# Patient Record
Sex: FEMALE | Race: ASIAN | NOT HISPANIC OR LATINO
[De-identification: names, ages, dates, MRNs, and addresses within clinical notes are randomized per-mention and may not be internally consistent; named-entity substitution may affect disease eponyms.]

---

## 2019-05-08 PROBLEM — Z00.00 ENCOUNTER FOR PREVENTIVE HEALTH EXAMINATION: Status: ACTIVE | Noted: 2019-05-08

## 2019-06-08 ENCOUNTER — APPOINTMENT (OUTPATIENT)
Dept: OPHTHALMOLOGY | Facility: CLINIC | Age: 44
End: 2019-06-08

## 2019-10-08 ENCOUNTER — APPOINTMENT (OUTPATIENT)
Dept: OPHTHALMOLOGY | Facility: CLINIC | Age: 44
End: 2019-10-08

## 2019-10-29 ENCOUNTER — APPOINTMENT (OUTPATIENT)
Dept: OPHTHALMOLOGY | Facility: CLINIC | Age: 44
End: 2019-10-29

## 2019-11-07 ENCOUNTER — APPOINTMENT (OUTPATIENT)
Dept: PAIN MANAGEMENT | Facility: CLINIC | Age: 44
End: 2019-11-07

## 2019-11-07 VITALS
SYSTOLIC BLOOD PRESSURE: 127 MMHG | DIASTOLIC BLOOD PRESSURE: 72 MMHG | HEIGHT: 57 IN | BODY MASS INDEX: 29.34 KG/M2 | HEART RATE: 62 BPM | WEIGHT: 136 LBS

## 2019-12-15 NOTE — ASSESSMENT
[FreeTextEntry1] : This is a 45 y/o female who claims wearing an ear piece for work at the [employer] increases headaches interfering with her ability to function. She claims having had surgical resection of a right hemispheric meningioma and also suffered a concussion after a slot machine fell on her head one year ago.\par \par In my medical opinion, at the time of my neurological examination, I do not see any focal neurological deficit to impair her ability to use a headset communication device. In view of her history of a right cerebral meningioma, which was reportedly resected successfully, it is my my neurological recommendation that Ms. Salma Woo, may benefit from using a hand held device instead of a headset communication device. \par \par \par \par _____________________________________________________________________________\par \par Barrie Price MD

## 2019-12-15 NOTE — HISTORY OF PRESENT ILLNESS
[FreeTextEntry1] : This is a case of a 45 y/o right handed female diagnosed with a right hemispheric meningioma in 2015 after complaining of headaches.  She reports being out of work for about one year following the surgical resection. Despite the surgery, she continues to complain of headaches especially when wearing glasses or using her cell phone. She reports having tried wearing the ear piece but developed increasing headaches. \par \par She denies taking any prescription medication for the headaches. \par She also reports having a slot machine fall on her head one year ago (exact date unknown) requiring her to remain out of work for a 3 month period. \par \par Review of Records: \par \par 674 pages from [place] [facility] [employer] [facility]

## 2019-12-15 NOTE — PHYSICAL EXAM
[General Appearance - Alert] : alert [General Appearance - Well Developed] : well developed [Affect] : the affect was normal [Person] : oriented to person [Place] : oriented to place [Time] : oriented to time [Concentration Intact] : normal concentrating ability [Visual Intact] : visual attention was ~T not ~L decreased [Naming Objects] : no difficulty naming common objects [Comprehension] : comprehension intact [Fluency] : fluency intact [Cranial Nerves Oculomotor (III)] : extraocular motion intact [Past History] : adequate knowledge of personal past history [Cranial Nerves Optic (II)] : visual acuity intact bilaterally,  visual fields full to confrontation, pupils equal round and reactive to light [Cranial Nerves Vestibulocochlear (VIII)] : hearing was intact bilaterally [Cranial Nerves Trigeminal (V)] : facial sensation intact symmetrically [Cranial Nerves Facial (VII)] : face symmetrical [Cranial Nerves Glossopharyngeal (IX)] : tongue and palate midline [Motor Tone] : muscle tone was normal in all four extremities [Cranial Nerves Hypoglossal (XII)] : there was no tongue deviation with protrusion [Cranial Nerves Accessory (XI - Cranial And Spinal)] : head turning and shoulder shrug symmetric [Motor Strength] : muscle strength was normal in all four extremities [Involuntary Movements] : no involuntary movements were seen [Sensation Tactile Decrease] : light touch was intact [No Muscle Atrophy] : normal bulk in all four extremities [Balance] : balance was intact [Abnormal Walk] : normal gait [2+] : Ankle jerk right 2+ [Outer Ear] : the ears and nose were normal in appearance [Neck Appearance] : the appearance of the neck was normal [Sclera] : the sclera and conjunctiva were normal [Nail Clubbing] : no clubbing  or cyanosis of the fingernails [] : no respiratory distress [Tremor] : no tremor present [Past-pointing] : there was no past-pointing [Plantar Reflex Right Only] : normal on the right [Plantar Reflex Left Only] : normal on the left [___] : absent on the right [___] : absent on the left [FreeTextEntry5] : No papilledema; Intact [place]

## 2020-08-04 ENCOUNTER — APPOINTMENT (OUTPATIENT)
Dept: OPHTHALMOLOGY | Facility: CLINIC | Age: 45
End: 2020-08-04

## 2021-04-09 ENCOUNTER — EMERGENCY (EMERGENCY)
Facility: HOSPITAL | Age: 46
LOS: 1 days | Discharge: HOME | End: 2021-04-09
Admitting: EMERGENCY MEDICINE
Payer: COMMERCIAL

## 2021-04-09 VITALS
DIASTOLIC BLOOD PRESSURE: 92 MMHG | SYSTOLIC BLOOD PRESSURE: 134 MMHG | HEART RATE: 65 BPM | OXYGEN SATURATION: 100 % | TEMPERATURE: 97 F | RESPIRATION RATE: 16 BRPM

## 2021-04-09 LAB
ALBUMIN SERPL ELPH-MCNC: 4.2 G/DL — SIGNIFICANT CHANGE UP (ref 3.3–5)
ALP SERPL-CCNC: 65 U/L — SIGNIFICANT CHANGE UP (ref 40–120)
ALT FLD-CCNC: 13 U/L — SIGNIFICANT CHANGE UP (ref 4–33)
ANION GAP SERPL CALC-SCNC: 9 MMOL/L — SIGNIFICANT CHANGE UP (ref 7–14)
APPEARANCE UR: CLEAR — SIGNIFICANT CHANGE UP
AST SERPL-CCNC: 15 U/L — SIGNIFICANT CHANGE UP (ref 4–32)
BASOPHILS # BLD AUTO: 0.04 K/UL — SIGNIFICANT CHANGE UP (ref 0–0.2)
BASOPHILS NFR BLD AUTO: 0.4 % — SIGNIFICANT CHANGE UP (ref 0–2)
BILIRUB SERPL-MCNC: <0.2 MG/DL — SIGNIFICANT CHANGE UP (ref 0.2–1.2)
BILIRUB UR-MCNC: NEGATIVE — SIGNIFICANT CHANGE UP
BUN SERPL-MCNC: 12 MG/DL — SIGNIFICANT CHANGE UP (ref 7–23)
CALCIUM SERPL-MCNC: 9.2 MG/DL — SIGNIFICANT CHANGE UP (ref 8.4–10.5)
CHLORIDE SERPL-SCNC: 107 MMOL/L — SIGNIFICANT CHANGE UP (ref 98–107)
CO2 SERPL-SCNC: 23 MMOL/L — SIGNIFICANT CHANGE UP (ref 22–31)
COLOR SPEC: SIGNIFICANT CHANGE UP
CREAT SERPL-MCNC: 0.76 MG/DL — SIGNIFICANT CHANGE UP (ref 0.5–1.3)
DIFF PNL FLD: NEGATIVE — SIGNIFICANT CHANGE UP
EOSINOPHIL # BLD AUTO: 0.15 K/UL — SIGNIFICANT CHANGE UP (ref 0–0.5)
EOSINOPHIL NFR BLD AUTO: 1.5 % — SIGNIFICANT CHANGE UP (ref 0–6)
GLUCOSE SERPL-MCNC: 86 MG/DL — SIGNIFICANT CHANGE UP (ref 70–99)
GLUCOSE UR QL: NEGATIVE — SIGNIFICANT CHANGE UP
HCT VFR BLD CALC: 36.7 % — SIGNIFICANT CHANGE UP (ref 34.5–45)
HGB BLD-MCNC: 12.2 G/DL — SIGNIFICANT CHANGE UP (ref 11.5–15.5)
IANC: 7.47 K/UL — SIGNIFICANT CHANGE UP (ref 1.5–8.5)
IMM GRANULOCYTES NFR BLD AUTO: 0.4 % — SIGNIFICANT CHANGE UP (ref 0–1.5)
KETONES UR-MCNC: NEGATIVE — SIGNIFICANT CHANGE UP
LEUKOCYTE ESTERASE UR-ACNC: NEGATIVE — SIGNIFICANT CHANGE UP
LYMPHOCYTES # BLD AUTO: 2.09 K/UL — SIGNIFICANT CHANGE UP (ref 1–3.3)
LYMPHOCYTES # BLD AUTO: 20.3 % — SIGNIFICANT CHANGE UP (ref 13–44)
MCHC RBC-ENTMCNC: 28.2 PG — SIGNIFICANT CHANGE UP (ref 27–34)
MCHC RBC-ENTMCNC: 33.2 GM/DL — SIGNIFICANT CHANGE UP (ref 32–36)
MCV RBC AUTO: 84.8 FL — SIGNIFICANT CHANGE UP (ref 80–100)
MONOCYTES # BLD AUTO: 0.51 K/UL — SIGNIFICANT CHANGE UP (ref 0–0.9)
MONOCYTES NFR BLD AUTO: 5 % — SIGNIFICANT CHANGE UP (ref 2–14)
NEUTROPHILS # BLD AUTO: 7.47 K/UL — HIGH (ref 1.8–7.4)
NEUTROPHILS NFR BLD AUTO: 72.4 % — SIGNIFICANT CHANGE UP (ref 43–77)
NITRITE UR-MCNC: NEGATIVE — SIGNIFICANT CHANGE UP
NRBC # BLD: 0 /100 WBCS — SIGNIFICANT CHANGE UP
NRBC # FLD: 0 K/UL — SIGNIFICANT CHANGE UP
PH UR: 6 — SIGNIFICANT CHANGE UP (ref 5–8)
PLATELET # BLD AUTO: 245 K/UL — SIGNIFICANT CHANGE UP (ref 150–400)
POTASSIUM SERPL-MCNC: 4.2 MMOL/L — SIGNIFICANT CHANGE UP (ref 3.5–5.3)
POTASSIUM SERPL-SCNC: 4.2 MMOL/L — SIGNIFICANT CHANGE UP (ref 3.5–5.3)
PROT SERPL-MCNC: 7.1 G/DL — SIGNIFICANT CHANGE UP (ref 6–8.3)
PROT UR-MCNC: NEGATIVE — SIGNIFICANT CHANGE UP
RBC # BLD: 4.33 M/UL — SIGNIFICANT CHANGE UP (ref 3.8–5.2)
RBC # FLD: 13.5 % — SIGNIFICANT CHANGE UP (ref 10.3–14.5)
SODIUM SERPL-SCNC: 139 MMOL/L — SIGNIFICANT CHANGE UP (ref 135–145)
SP GR SPEC: 1.01 — SIGNIFICANT CHANGE UP (ref 1.01–1.02)
UROBILINOGEN FLD QL: SIGNIFICANT CHANGE UP
WBC # BLD: 10.3 K/UL — SIGNIFICANT CHANGE UP (ref 3.8–10.5)
WBC # FLD AUTO: 10.3 K/UL — SIGNIFICANT CHANGE UP (ref 3.8–10.5)

## 2021-04-09 PROCEDURE — 74177 CT ABD & PELVIS W/CONTRAST: CPT | Mod: 26

## 2021-04-09 PROCEDURE — 76830 TRANSVAGINAL US NON-OB: CPT | Mod: 26

## 2021-04-09 PROCEDURE — 99285 EMERGENCY DEPT VISIT HI MDM: CPT

## 2021-04-09 RX ORDER — IBUPROFEN 200 MG
600 TABLET ORAL ONCE
Refills: 0 | Status: COMPLETED | OUTPATIENT
Start: 2021-04-09 | End: 2021-04-09

## 2021-04-09 NOTE — ED PROVIDER NOTE - CLINICAL SUMMARY MEDICAL DECISION MAKING FREE TEXT BOX
45 year old female with PMH of Brain mass (s/p resection) presents to the ED complaining of left sided pelvic pain for a month. HD stable. On exam abdomen soft, non-distended, mild tenderness to LLQ and Left adnexal area. Imp: Left-sided pelvic pain, concern diverticulitis vs for left adnexal cyst. Plan for labs, TVUS, CT A/P, and UA, analgesics, reassess.

## 2021-04-09 NOTE — ED PROVIDER NOTE - NSFOLLOWUPINSTRUCTIONS_ED_ALL_ED_FT
Follow up with your primary medical doctor within 2-3 days of ER discharge.    If you need to find a doctor to follow up with, you may call the [facility] Patient Access Services helpline at [phone] to find names/contact #s for a practitioner (or specialist) to follow up with.    Bring your discharge papers / test results with you to your follow up appointment(s).    Rest / no strenuous activity.  Stay well hydrated.    ***Return to the Emergency Department immediately if you experience any new / worsening symptoms or have any problems / concerns.***

## 2021-04-09 NOTE — ED PROVIDER NOTE - OBJECTIVE STATEMENT
45 year old female with PMH of Brain mass (s/p resection) presents to the ED complaining of left sided pelvic pain for a month. Pt describes LLQ abdominal pain radiating to the left pelvic area, intermittent, feels like pressure, sharp, relieved by Ibuprofen/Tylenol. Pt denies any dysuria, urinary frequency/urgency, hematuria. Also denies nausea, vomiting, diarrhea, constipation, melena, hematochezia, fevers and chills, weakness, numbness and tingling sensation. Denies any other complaints.

## 2021-04-09 NOTE — ED PROVIDER NOTE - PROGRESS NOTE DETAILS
NENA Dickinson Addendum----This patient was signed out to me by NENA Dunn; case and tests resulted thus far were discussed; pending was pending CT result at time of sign-out.  In the interim, pt noted to be objectively resting comfortably; no issues thus far.  CT abd/pelvis shows no evidence of acute pathology.  Pt. will be discharged per below instructions.

## 2021-04-09 NOTE — ED PROVIDER NOTE - PATIENT PORTAL LINK FT
You can access the FollowMyHealth Patient Portal offered by [facility] by registering at the following website: [email or web address]. By joining [employer]’s FollowMyHealth portal, you will also be able to view your health information using other applications (apps) compatible with our system.

## 2021-04-10 VITALS
OXYGEN SATURATION: 100 % | RESPIRATION RATE: 18 BRPM | TEMPERATURE: 99 F | DIASTOLIC BLOOD PRESSURE: 90 MMHG | HEART RATE: 70 BPM | SYSTOLIC BLOOD PRESSURE: 138 MMHG

## 2021-04-11 LAB
CULTURE RESULTS: NO GROWTH — SIGNIFICANT CHANGE UP
SPECIMEN SOURCE: SIGNIFICANT CHANGE UP

## 2021-11-09 ENCOUNTER — EMERGENCY (EMERGENCY)
Facility: HOSPITAL | Age: 46
LOS: 1 days | Discharge: LEFT BEFORE DISCHARGE | End: 2021-11-09
Attending: EMERGENCY MEDICINE | Admitting: EMERGENCY MEDICINE
Payer: COMMERCIAL

## 2021-11-09 VITALS
SYSTOLIC BLOOD PRESSURE: 142 MMHG | TEMPERATURE: 98 F | OXYGEN SATURATION: 100 % | RESPIRATION RATE: 16 BRPM | DIASTOLIC BLOOD PRESSURE: 73 MMHG | HEART RATE: 60 BPM

## 2021-11-09 VITALS
TEMPERATURE: 100 F | RESPIRATION RATE: 20 BRPM | DIASTOLIC BLOOD PRESSURE: 79 MMHG | HEART RATE: 64 BPM | SYSTOLIC BLOOD PRESSURE: 142 MMHG | OXYGEN SATURATION: 100 %

## 2021-11-09 LAB
ALBUMIN SERPL ELPH-MCNC: 4.2 G/DL — SIGNIFICANT CHANGE UP (ref 3.3–5)
ALP SERPL-CCNC: 73 U/L — SIGNIFICANT CHANGE UP (ref 40–120)
ALT FLD-CCNC: 18 U/L — SIGNIFICANT CHANGE UP (ref 4–33)
ANION GAP SERPL CALC-SCNC: 11 MMOL/L — SIGNIFICANT CHANGE UP (ref 7–14)
APPEARANCE UR: CLEAR — SIGNIFICANT CHANGE UP
AST SERPL-CCNC: 20 U/L — SIGNIFICANT CHANGE UP (ref 4–32)
BASOPHILS # BLD AUTO: 0.04 K/UL — SIGNIFICANT CHANGE UP (ref 0–0.2)
BASOPHILS NFR BLD AUTO: 0.4 % — SIGNIFICANT CHANGE UP (ref 0–2)
BILIRUB SERPL-MCNC: <0.2 MG/DL — SIGNIFICANT CHANGE UP (ref 0.2–1.2)
BILIRUB UR-MCNC: NEGATIVE — SIGNIFICANT CHANGE UP
BUN SERPL-MCNC: 9 MG/DL — SIGNIFICANT CHANGE UP (ref 7–23)
CALCIUM SERPL-MCNC: 9.3 MG/DL — SIGNIFICANT CHANGE UP (ref 8.4–10.5)
CHLORIDE SERPL-SCNC: 105 MMOL/L — SIGNIFICANT CHANGE UP (ref 98–107)
CO2 SERPL-SCNC: 26 MMOL/L — SIGNIFICANT CHANGE UP (ref 22–31)
COLOR SPEC: SIGNIFICANT CHANGE UP
CREAT SERPL-MCNC: 0.67 MG/DL — SIGNIFICANT CHANGE UP (ref 0.5–1.3)
DIFF PNL FLD: NEGATIVE — SIGNIFICANT CHANGE UP
EOSINOPHIL # BLD AUTO: 0.14 K/UL — SIGNIFICANT CHANGE UP (ref 0–0.5)
EOSINOPHIL NFR BLD AUTO: 1.5 % — SIGNIFICANT CHANGE UP (ref 0–6)
GLUCOSE SERPL-MCNC: 84 MG/DL — SIGNIFICANT CHANGE UP (ref 70–99)
GLUCOSE UR QL: NEGATIVE — SIGNIFICANT CHANGE UP
HCT VFR BLD CALC: 36.9 % — SIGNIFICANT CHANGE UP (ref 34.5–45)
HGB BLD-MCNC: 12.3 G/DL — SIGNIFICANT CHANGE UP (ref 11.5–15.5)
IANC: 6.82 K/UL — SIGNIFICANT CHANGE UP (ref 1.5–8.5)
IMM GRANULOCYTES NFR BLD AUTO: 0.6 % — SIGNIFICANT CHANGE UP (ref 0–1.5)
KETONES UR-MCNC: NEGATIVE — SIGNIFICANT CHANGE UP
LEUKOCYTE ESTERASE UR-ACNC: NEGATIVE — SIGNIFICANT CHANGE UP
LIDOCAIN IGE QN: 33 U/L — SIGNIFICANT CHANGE UP (ref 7–60)
LYMPHOCYTES # BLD AUTO: 1.95 K/UL — SIGNIFICANT CHANGE UP (ref 1–3.3)
LYMPHOCYTES # BLD AUTO: 20.4 % — SIGNIFICANT CHANGE UP (ref 13–44)
MCHC RBC-ENTMCNC: 28.1 PG — SIGNIFICANT CHANGE UP (ref 27–34)
MCHC RBC-ENTMCNC: 33.3 GM/DL — SIGNIFICANT CHANGE UP (ref 32–36)
MCV RBC AUTO: 84.4 FL — SIGNIFICANT CHANGE UP (ref 80–100)
MONOCYTES # BLD AUTO: 0.54 K/UL — SIGNIFICANT CHANGE UP (ref 0–0.9)
MONOCYTES NFR BLD AUTO: 5.7 % — SIGNIFICANT CHANGE UP (ref 2–14)
NEUTROPHILS # BLD AUTO: 6.82 K/UL — SIGNIFICANT CHANGE UP (ref 1.8–7.4)
NEUTROPHILS NFR BLD AUTO: 71.4 % — SIGNIFICANT CHANGE UP (ref 43–77)
NITRITE UR-MCNC: NEGATIVE — SIGNIFICANT CHANGE UP
NRBC # BLD: 0 /100 WBCS — SIGNIFICANT CHANGE UP
NRBC # FLD: 0 K/UL — SIGNIFICANT CHANGE UP
PH UR: 8 — SIGNIFICANT CHANGE UP (ref 5–8)
PLATELET # BLD AUTO: 262 K/UL — SIGNIFICANT CHANGE UP (ref 150–400)
POTASSIUM SERPL-MCNC: 3.9 MMOL/L — SIGNIFICANT CHANGE UP (ref 3.5–5.3)
POTASSIUM SERPL-SCNC: 3.9 MMOL/L — SIGNIFICANT CHANGE UP (ref 3.5–5.3)
PROT SERPL-MCNC: 7.6 G/DL — SIGNIFICANT CHANGE UP (ref 6–8.3)
PROT UR-MCNC: NEGATIVE — SIGNIFICANT CHANGE UP
RBC # BLD: 4.37 M/UL — SIGNIFICANT CHANGE UP (ref 3.8–5.2)
RBC # FLD: 13.2 % — SIGNIFICANT CHANGE UP (ref 10.3–14.5)
SODIUM SERPL-SCNC: 142 MMOL/L — SIGNIFICANT CHANGE UP (ref 135–145)
SP GR SPEC: 1.01 — SIGNIFICANT CHANGE UP (ref 1–1.05)
UROBILINOGEN FLD QL: SIGNIFICANT CHANGE UP
WBC # BLD: 9.55 K/UL — SIGNIFICANT CHANGE UP (ref 3.8–10.5)
WBC # FLD AUTO: 9.55 K/UL — SIGNIFICANT CHANGE UP (ref 3.8–10.5)

## 2021-11-09 PROCEDURE — 74177 CT ABD & PELVIS W/CONTRAST: CPT | Mod: 26,MA

## 2021-11-09 PROCEDURE — 99285 EMERGENCY DEPT VISIT HI MDM: CPT

## 2021-11-09 RX ORDER — ACETAMINOPHEN 500 MG
650 TABLET ORAL ONCE
Refills: 0 | Status: COMPLETED | OUTPATIENT
Start: 2021-11-09 | End: 2021-11-09

## 2021-11-09 RX ORDER — MORPHINE SULFATE 50 MG/1
2 CAPSULE, EXTENDED RELEASE ORAL ONCE
Refills: 0 | Status: DISCONTINUED | OUTPATIENT
Start: 2021-11-09 | End: 2021-11-09

## 2021-11-09 RX ADMIN — Medication 650 MILLIGRAM(S): at 17:42

## 2021-11-09 RX ADMIN — Medication 650 MILLIGRAM(S): at 16:57

## 2021-11-09 NOTE — ED PROVIDER NOTE - CLINICAL SUMMARY MEDICAL DECISION MAKING FREE TEXT BOX
Impression:  LLQ pain concerning for diverticulitis >> L ovarian cyst  Plan:  labs, CT, pain meds reassess.

## 2021-11-09 NOTE — ED PROVIDER NOTE - IV ALTEPLASE EXCL ABS HIDDEN
Detail Level: Zone
Other (Free Text): Patient presents s/p breast augmentation with liposuction to inner and outer thighs, performed 12/27/2018 patient states she’s doing well with no concerns. Dr. Sal examined patients breast and states that her R breast is taking shape well, however, the Left breast will need to be massaged to help the shape to form. Dr. Sal also recommended that she start using the scar fading tape to help the scar fade nicely p. Patient was made aware that she should start washing the skin glue off as it has served its purpose. Patient was educated/ shown how to massage the left breast to help the implant to fall down. Patient verbalized understanding.Dr. Sal snipped a few suture tails today. Patient was given tape to go home and start taping.
show

## 2021-11-09 NOTE — ED PROVIDER NOTE - PATIENT PORTAL LINK FT
You can access the FollowMyHealth Patient Portal offered by [facility] by registering at the following website: [email or web address]. By joining [employer]’s FollowMyHealth portal, you will also be able to view your health information using other applications (apps) compatible with our system.

## 2021-11-09 NOTE — ED PROVIDER NOTE - OBJECTIVE STATEMENT
47 yo F, c/o LLQ pain  Duration: intermittent x 2wk   Associated Sx: no n/v/d, no f/c, no back pain, no vaginal bleeding, no discharge.   Context:  seen in the ED 4/2021; CT abd/pelvis and pelvis US remarkable only for malrotated L kidney.  Better/worse: no clear modifiers  Intensity: vascillates between 4/10 <--> 10/10

## 2021-11-09 NOTE — ED ADULT NURSE REASSESSMENT NOTE - NS ED NURSE REASSESS COMMENT FT1
Patient states son was coming to pick her, asked for IV to be pulled and paper work to be given. Patient does not want to wait for CT results. Refused DC vitals. NENA Glez made aware. Patient became agitated when receiving discharge instructions from NENA Glez. Stating " get me a new doctor, you don't know what your talking about'. MD Carlton attempted to discuss paper work and results, attempted to calm patient with detailed information but patient became aggressive, name calling and yelling at both MD and PA. Stated "your so rad, Id fuck you up" to staff. Also stated "I will liz you stupid ass people". Patient left department before security called. IV lined discontinued before patient left.

## 2021-11-09 NOTE — ED PROVIDER NOTE - PROGRESS NOTE DETAILS
NENA HARDING: Patient signed out to me by Dr. Franco to f/u CT A/P. Pending Ct A/P, spoke with CT tech, informed negative Ucg, will take patient soon. Patient reassessed, sitting comfortably in chair in NAD, states she's hungry. Will continue to monitor and reassess. PA MEHDI: Pt reassessed at bedside, sitting comfortably, states her son is picking her up, has early shift for her job, wants to get dress and go home, requesting IV pulled; states will call tomorrow for CT results. pending CT A/P results, spoke with radiology, states report has been dictated, will come out shortly. discussed with attending, if patient leave w/o CT results, will [place] patient. Dexter RUBIO: Patient signed out pending CT Scan but did not want to stay. I attempted to speak to her but she is agitated, yelling at staff, threatening litigation. I apologized for the wait time, explained we are waiting for CT Scan and she resumed discussing how long she has been here. NENA Glez has paperwork in hand, and patient called me a 'fucking loser' and continued to talk about how she is going to liz all of us. CT Scan resulted in this time and is concerning for gastritis. NENA Glez is discussing results with her. Dexter RUBIO: Patient asked to speak to the doctor to explain results. I spoke to patient in detail regarding the CT results and fhe findings of gastritis/ small left sided cyst. I obtained additional history and she denied vaginal discharge and new sexual partners. She stated she still had pain. I advised that the only other test would be an ultrasound to evaluate her ovaries and I can order that. Patient intent on leaving, became abusive stating we should have known better and that we should have a reason for pain. I attempted to explain that having a reason is not always feasible in the emergency setting. Patient became threatening, saying her aunt is a big time doctor at [place] and that I will be in big trouble. I told her we are here to help her and she exclaimed, you should know better, this should have been done before. She threatened to liz us or 'fuck me up' and called me rad. I informed her she was being inappropriate and that there is no need to speak this way. She continued. I asked for security to be called and patient walked out screaming. This was witnessed by RN's in results waiting, NENA Glez as well as other staff. Dexter RUBIO: Patient asked to speak to the doctor to explain results. I spoke to patient in detail regarding the CT results and fhe findings of gastritis/ small left sided cyst. I obtained additional history and she denied vaginal discharge and new sexual partners. She stated she still had pain. I advised that the only other test would be an ultrasound to evaluate her ovaries and I can order that. Patient intent on leaving, became abusive stating we should have known better and that we should have a reason for pain. I attempted to explain that having a reason is not always feasible in the emergency setting. Patient became threatening, saying her aunt is a big time doctor at [place] and that I will be in big trouble. I told her we are here to help her and she exclaimed, you should know better, this should have been done before. She threatened to liz us or 'fuck me up' and called me rad. I informed her she was being inappropriate and that there is no need to speak this way. She continued. I asked for security to be called and patient walked out screaming, calling me a 'fucking white piece of shit'. This was witnessed by RN's in results waiting, NENA Glez as well as other staff. PA MEHDI: Pt reassessed at bedside, sitting comfortably, states her son is picking her up, has early shift for her job, wants to get dress and go home, requesting IV pulled; states will call tomorrow for CT results. pending CT A/P results, spoke with radiology, states report has been dictated, will come out shortly. discussed with attending, if patient leave w/o CT results, will [place] patient. While trying to AMA patient, informed by RN that CT results are back. NENA HARDING: discussed CT A/P results w/ patient, still has question regarding her pain, requesting to speak with a doctor. While Dr. Renteria was explaining to the patient about her CT results, patient became very upset that we didn't find "something dangerous" to explain her pain and that she waited since 2PM which she was way longer than last time she was here, states she'll report about the hospital and short of staffing; patient was being rude, aggressive and nasty toward attending. Dexter RUBIO: Patient asked to speak to the doctor to explain results. I spoke to patient in detail regarding the CT results and fhe findings of gastritis/ small left sided cyst. I obtained additional history and she denied vaginal discharge and new sexual partners. She stated she still had pain. I advised that the only other test would be an ultrasound to evaluate her ovaries (though time frame of pain does not support it) and I can order that. Patient intent on leaving, became abusive stating we should have known better and that we should have a reason for pain. I attempted to explain that having a reason is not always feasible in the emergency setting. Patient became threatening, saying her aunt is a big time doctor at [place] and that I will be in big trouble. I told her we are here to help her and she exclaimed, you should know better, this should have been done before. She threatened to liz us or 'fuck me up' and called me rad. I informed her she was being inappropriate and that there is no need to speak this way. She continued. I asked for security to be called and patient walked out screaming, calling me a 'fucking white piece of shit'. This was witnessed by RN's in results waiting, NENA Glez as well as other staff.

## 2021-11-09 NOTE — ED ADULT NURSE NOTE - OBJECTIVE STATEMENT
Pt received c/o LLQ pain. Pt states she was hospitalized for this same pain in April. Denies CP, denies SOB, respirations even and unlabored. Pt appears anxious. Comfort measures provided. 20g PIV placed in L AC, labs drawn as ordered, Will administer pain meds as ordered. Continue to monitor.

## 2021-11-10 LAB
CULTURE RESULTS: SIGNIFICANT CHANGE UP
SPECIMEN SOURCE: SIGNIFICANT CHANGE UP

## 2022-03-27 ENCOUNTER — EMERGENCY (EMERGENCY)
Facility: HOSPITAL | Age: 47
LOS: 1 days | Discharge: HOME | End: 2022-03-27
Attending: EMERGENCY MEDICINE | Admitting: EMERGENCY MEDICINE
Payer: COMMERCIAL

## 2022-03-27 VITALS
RESPIRATION RATE: 16 BRPM | SYSTOLIC BLOOD PRESSURE: 115 MMHG | DIASTOLIC BLOOD PRESSURE: 73 MMHG | TEMPERATURE: 99 F | HEART RATE: 70 BPM | OXYGEN SATURATION: 100 %

## 2022-03-27 VITALS
DIASTOLIC BLOOD PRESSURE: 91 MMHG | HEART RATE: 73 BPM | SYSTOLIC BLOOD PRESSURE: 127 MMHG | TEMPERATURE: 98 F | RESPIRATION RATE: 16 BRPM | OXYGEN SATURATION: 100 %

## 2022-03-27 LAB
APPEARANCE UR: CLEAR — SIGNIFICANT CHANGE UP
BACTERIA # UR AUTO: NEGATIVE — SIGNIFICANT CHANGE UP
BILIRUB UR-MCNC: NEGATIVE — SIGNIFICANT CHANGE UP
COLOR SPEC: SIGNIFICANT CHANGE UP
DIFF PNL FLD: NEGATIVE — SIGNIFICANT CHANGE UP
GLUCOSE UR QL: NEGATIVE — SIGNIFICANT CHANGE UP
KETONES UR-MCNC: NEGATIVE — SIGNIFICANT CHANGE UP
LEUKOCYTE ESTERASE UR-ACNC: NEGATIVE — SIGNIFICANT CHANGE UP
NITRITE UR-MCNC: NEGATIVE — SIGNIFICANT CHANGE UP
PH UR: 6 — SIGNIFICANT CHANGE UP (ref 5–8)
PROT UR-MCNC: NEGATIVE — SIGNIFICANT CHANGE UP
RBC CASTS # UR COMP ASSIST: 1 /HPF — SIGNIFICANT CHANGE UP (ref 0–4)
SP GR SPEC: 1.02 — SIGNIFICANT CHANGE UP (ref 1–1.05)
UROBILINOGEN FLD QL: SIGNIFICANT CHANGE UP
WBC UR QL: 1 /HPF — SIGNIFICANT CHANGE UP (ref 0–5)

## 2022-03-27 PROCEDURE — 99284 EMERGENCY DEPT VISIT MOD MDM: CPT

## 2022-03-27 PROCEDURE — 76830 TRANSVAGINAL US NON-OB: CPT | Mod: 26

## 2022-03-27 NOTE — ED PROVIDER NOTE - PATIENT PORTAL LINK FT
You can access the FollowMyHealth Patient Portal offered by [facility] by registering at the following website: [email or web address]. By joining [employer]’s FollowMyHealth portal, you will also be able to view your health information using other applications (apps) compatible with our system.

## 2022-03-27 NOTE — ED PROVIDER NOTE - ATTENDING CONTRIBUTION TO CARE
Attending note:   After face to face evaluation of this patient, I concur with above noted hx, pe, and care plan for this patient.  Viveros: 46 yof with hx of pelvic pain complaining left sided abdominal pain for one week with dysuria going on longer. Pt states she has a history of multiple UTIs. Denies any other associated GI symptoms. On exam, pt demonstrated more rlq tn during bimanual exam, no CVAT, mild suprapubic tn on abdominal exam and otherwise unremarakble, pt well appearing.   Pt with left sided pain but right sided tn on exam - unsure with GI or gyn related - will check UA, labs, pain meds, and US to assess pelvic pathology.

## 2022-03-27 NOTE — ED PROVIDER NOTE - CLINICAL SUMMARY MEDICAL DECISION MAKING FREE TEXT BOX
Orlando Esquivel DO PGY-1: 46yF with no PMH presents to the ED c/o 6 wks of dysuria and 1 wk of L pelvic pain. Pt completed a course of macrobid approx 4 wks ago but symptoms persisted. Pt has had the L pelvic pain before and was diagnosed at the [facility] with ruptured corpus luteal cysts approx 1 yr ago. Pt is sexually active with one male partner for the past 6 yrs.  Denies fever, n/v/d, abdominal surgeries, vaginal discharge, CP, SOB, hematuria. Pt has L pelvic ttp no CVA ttp. Concerning for UTI, ruptured cyst. Will obtain UA/UC, upreg, TVUS. Reassess. Orlando Esquivel DO PGY-1: 46yF with no PMH presents to the ED c/o 6 wks of dysuria and 1 wk of L pelvic pain. Pt completed a course of macrobid approx 4 wks ago but symptoms persisted. Pt has had the L pelvic pain before and was diagnosed at the [facility] with ruptured corpus luteal cysts approx 1 yr ago. Pt is sexually active with one male partner for the past 6 yrs.  Denies fever, n/v/d, abdominal surgeries, vaginal discharge, CP, SOB, hematuria. Pt has L pelvic ttp, R adnexal ttp, no CVA ttp. Concerning for UTI, ruptured cyst. Will obtain UA/UC, upreg, TVUS. Reassess.

## 2022-03-27 NOTE — ED PROVIDER NOTE - PROGRESS NOTE DETAILS
Orlando Esquivel DO PGY-1: TVUS shows small L hemorrhagic cyst. Pt repeat abdominal exam was benign. Will dc pt and pt will f/u with PMD

## 2022-03-27 NOTE — ED PROVIDER NOTE - NS ED ROS FT
GENERAL: No fever, chills  CARDIAC: no chest pain/pressure, SOB, lower extremity swelling  PULMONARY: no cough, SOB  GI: + abdominal pain, no n/v/d  : +dysuria, no hematuria  SKIN: no rashes, no ecchymosis  NEURO: no headache, lightheadedness  MSK: No joint pain, myalgia, weakness.

## 2022-03-27 NOTE — ED PROVIDER NOTE - OBJECTIVE STATEMENT
46yF with no PMH presents to the ED c/o 6 wks of dysuria and 1 wk of L pelvic pain. Pt completed a course of macrobid approx 4 wks ago but symptoms persisted. Pt has had the L pelvic pain before and was diagnosed at the [facility] with ruptured corpus luteal cysts approx 1 yr ago. Pt is sexually active with one male partner for the past 6 yrs.  Denies fever, n/v/d, abdominal surgeries, vaginal discharge, CP, SOB, hematuria. 46yF with no PMH presents to the ED c/o 6 wks of dysuria and 1 wk of L pelvic pain. Pt completed a course of macrobid approx 4 wks ago but symptoms persisted. Pt has had the L pelvic pain before and was diagnosed at the [facility] with ruptured corpus luteal cysts approx 1 yr ago. Pt is sexually active with one male partner for the past 6 yrs.  Denies fever, n/v/d, abdominal surgeries, vaginal discharge, CP, SOB, hematuria. Pt is scheduled for a colonoscopy in early April.

## 2022-03-27 NOTE — ED PROVIDER NOTE - NSFOLLOWUPINSTRUCTIONS_ED_ALL_ED_FT
1. Please follow-up with your primary care doctor and OB/GYN.    2. Please follow-up with gastroenterology for your colonoscopy as scheduled.    3. Please return to the ER if you develop worsening abdominal or pelvic pain, fever, intractable vomiting, or anything of concern.

## 2022-03-27 NOTE — ED PROVIDER NOTE - PHYSICAL EXAMINATION
GEN: Patient awake alert NAD.   HEENT: normocephalic, atraumatic, moist MM  CARDIAC: RRR, S1, S2, no murmur.   PULM: CTA B/L no wheeze, rhonchi, rales.   ABD: soft, L pelvic ttp, ND, no rebound no guarding, no CVA tenderness.   MSK: Moving all extremities, no edema.   SKIN: warm, dry, no rash.  PELVIC: Bimanual: R adnexal ttp, no CMT or masses.     Chaperone for pelvic exam Dr. Viveros.

## 2022-03-28 LAB
CULTURE RESULTS: SIGNIFICANT CHANGE UP
SPECIMEN SOURCE: SIGNIFICANT CHANGE UP

## 2025-06-28 NOTE — ED PROVIDER NOTE - DISPOSITION TYPE
AMA OB Provider reported that the vagina was inspected and no foreign body was found/Laps, needles and instrument count was correct